# Patient Record
Sex: FEMALE | Race: WHITE | NOT HISPANIC OR LATINO | URBAN - METROPOLITAN AREA
[De-identification: names, ages, dates, MRNs, and addresses within clinical notes are randomized per-mention and may not be internally consistent; named-entity substitution may affect disease eponyms.]

---

## 2017-06-09 ENCOUNTER — OUTPATIENT (OUTPATIENT)
Dept: OUTPATIENT SERVICES | Facility: HOSPITAL | Age: 57
LOS: 1 days | End: 2017-06-09
Payer: COMMERCIAL

## 2017-06-09 VITALS
HEART RATE: 60 BPM | HEIGHT: 67 IN | WEIGHT: 246.92 LBS | RESPIRATION RATE: 16 BRPM | OXYGEN SATURATION: 99 % | TEMPERATURE: 98 F | SYSTOLIC BLOOD PRESSURE: 132 MMHG | DIASTOLIC BLOOD PRESSURE: 82 MMHG

## 2017-06-09 DIAGNOSIS — K80.30 CALCULUS OF BILE DUCT WITH CHOLANGITIS, UNSPECIFIED, WITHOUT OBSTRUCTION: ICD-10-CM

## 2017-06-09 DIAGNOSIS — K21.9 GASTRO-ESOPHAGEAL REFLUX DISEASE WITHOUT ESOPHAGITIS: ICD-10-CM

## 2017-06-09 DIAGNOSIS — G47.33 OBSTRUCTIVE SLEEP APNEA (ADULT) (PEDIATRIC): ICD-10-CM

## 2017-06-09 DIAGNOSIS — Z90.710 ACQUIRED ABSENCE OF BOTH CERVIX AND UTERUS: Chronic | ICD-10-CM

## 2017-06-09 DIAGNOSIS — I10 ESSENTIAL (PRIMARY) HYPERTENSION: ICD-10-CM

## 2017-06-09 DIAGNOSIS — Z98.890 OTHER SPECIFIED POSTPROCEDURAL STATES: Chronic | ICD-10-CM

## 2017-06-09 LAB
ALBUMIN SERPL ELPH-MCNC: 4.1 G/DL — SIGNIFICANT CHANGE UP (ref 3.3–5)
ALP SERPL-CCNC: 113 U/L — SIGNIFICANT CHANGE UP (ref 40–120)
ALT FLD-CCNC: 16 U/L — SIGNIFICANT CHANGE UP (ref 4–33)
AST SERPL-CCNC: 19 U/L — SIGNIFICANT CHANGE UP (ref 4–32)
BILIRUB SERPL-MCNC: 0.5 MG/DL — SIGNIFICANT CHANGE UP (ref 0.2–1.2)
BUN SERPL-MCNC: 9 MG/DL — SIGNIFICANT CHANGE UP (ref 7–23)
CALCIUM SERPL-MCNC: 9.4 MG/DL — SIGNIFICANT CHANGE UP (ref 8.4–10.5)
CHLORIDE SERPL-SCNC: 101 MMOL/L — SIGNIFICANT CHANGE UP (ref 98–107)
CO2 SERPL-SCNC: 28 MMOL/L — SIGNIFICANT CHANGE UP (ref 22–31)
CREAT SERPL-MCNC: 0.94 MG/DL — SIGNIFICANT CHANGE UP (ref 0.5–1.3)
GLUCOSE SERPL-MCNC: 87 MG/DL — SIGNIFICANT CHANGE UP (ref 70–99)
HCT VFR BLD CALC: 42.9 % — SIGNIFICANT CHANGE UP (ref 34.5–45)
HGB BLD-MCNC: 13.6 G/DL — SIGNIFICANT CHANGE UP (ref 11.5–15.5)
MCHC RBC-ENTMCNC: 29.2 PG — SIGNIFICANT CHANGE UP (ref 27–34)
MCHC RBC-ENTMCNC: 31.7 % — LOW (ref 32–36)
MCV RBC AUTO: 92.3 FL — SIGNIFICANT CHANGE UP (ref 80–100)
PLATELET # BLD AUTO: 187 K/UL — SIGNIFICANT CHANGE UP (ref 150–400)
PMV BLD: 10.2 FL — SIGNIFICANT CHANGE UP (ref 7–13)
POTASSIUM SERPL-MCNC: 4.2 MMOL/L — SIGNIFICANT CHANGE UP (ref 3.5–5.3)
POTASSIUM SERPL-SCNC: 4.2 MMOL/L — SIGNIFICANT CHANGE UP (ref 3.5–5.3)
PROT SERPL-MCNC: 7.9 G/DL — SIGNIFICANT CHANGE UP (ref 6–8.3)
RBC # BLD: 4.65 M/UL — SIGNIFICANT CHANGE UP (ref 3.8–5.2)
RBC # FLD: 14.1 % — SIGNIFICANT CHANGE UP (ref 10.3–14.5)
SODIUM SERPL-SCNC: 141 MMOL/L — SIGNIFICANT CHANGE UP (ref 135–145)
WBC # BLD: 6.79 K/UL — SIGNIFICANT CHANGE UP (ref 3.8–10.5)
WBC # FLD AUTO: 6.79 K/UL — SIGNIFICANT CHANGE UP (ref 3.8–10.5)

## 2017-06-09 PROCEDURE — 93010 ELECTROCARDIOGRAM REPORT: CPT

## 2017-06-09 RX ORDER — SODIUM CHLORIDE 9 MG/ML
1000 INJECTION, SOLUTION INTRAVENOUS
Qty: 0 | Refills: 0 | Status: DISCONTINUED | OUTPATIENT
Start: 2017-06-16 | End: 2017-07-01

## 2017-06-09 NOTE — H&P PST ADULT - MUSCULOSKELETAL
details… detailed exam no joint warmth/ROM intact/no joint erythema/no joint swelling/normal strength/no calf tenderness

## 2017-06-09 NOTE — H&P PST ADULT - RS GEN PE MLT RESP DETAILS PC
no rhonchi/no rales/respirations non-labored/good air movement/clear to auscultation bilaterally/breath sounds equal/no wheezes

## 2017-06-09 NOTE — H&P PST ADULT - HISTORY OF PRESENT ILLNESS
This is a 56 y.o. female who presented to [facility] complaining of abdominal pain . Pt had abdominal sono in past , abdominal ex-ray done. Pt has calculus of bile duct with cholangitis ,unspecified without obstruction . Pt now for surgery.

## 2017-06-09 NOTE — H&P PST ADULT - LYMPHATIC
anterior cervical R/supraclavicular L/posterior cervical L/supraclavicular R/anterior cervical L/posterior cervical R

## 2017-06-09 NOTE — H&P PST ADULT - PMH
Essential hypertension    Gastroesophageal reflux disease without esophagitis    Obesity  BMI 38.7  Vitamin D deficiency

## 2017-06-09 NOTE — H&P PST ADULT - NSANTHOSAYNRD_GEN_A_CORE
No. VIKRAM screening performed.  STOP BANG Legend: 0-2 = LOW Risk; 3-4 = INTERMEDIATE Risk; 5-8 = HIGH Risk

## 2017-06-16 ENCOUNTER — OUTPATIENT (OUTPATIENT)
Dept: OUTPATIENT SERVICES | Facility: HOSPITAL | Age: 57
LOS: 1 days | Discharge: HOME | End: 2017-06-16
Payer: COMMERCIAL

## 2017-06-16 VITALS
RESPIRATION RATE: 17 BRPM | OXYGEN SATURATION: 98 % | SYSTOLIC BLOOD PRESSURE: 131 MMHG | HEART RATE: 83 BPM | DIASTOLIC BLOOD PRESSURE: 64 MMHG

## 2017-06-16 VITALS
HEART RATE: 65 BPM | SYSTOLIC BLOOD PRESSURE: 176 MMHG | OXYGEN SATURATION: 100 % | RESPIRATION RATE: 16 BRPM | HEIGHT: 67 IN | WEIGHT: 246.92 LBS | DIASTOLIC BLOOD PRESSURE: 89 MMHG | TEMPERATURE: 98 F

## 2017-06-16 DIAGNOSIS — Z90.710 ACQUIRED ABSENCE OF BOTH CERVIX AND UTERUS: Chronic | ICD-10-CM

## 2017-06-16 DIAGNOSIS — Z98.890 OTHER SPECIFIED POSTPROCEDURAL STATES: Chronic | ICD-10-CM

## 2017-06-16 DIAGNOSIS — K80.30 CALCULUS OF BILE DUCT WITH CHOLANGITIS, UNSPECIFIED, WITHOUT OBSTRUCTION: ICD-10-CM

## 2017-06-16 LAB
BLD GP AB SCN SERPL QL: NEGATIVE — SIGNIFICANT CHANGE UP
RH IG SCN BLD-IMP: POSITIVE — SIGNIFICANT CHANGE UP

## 2017-06-16 PROCEDURE — 93010 ELECTROCARDIOGRAM REPORT: CPT

## 2017-06-16 PROCEDURE — 88304 TISSUE EXAM BY PATHOLOGIST: CPT | Mod: 26

## 2017-06-16 RX ORDER — ATENOLOL 25 MG/1
1 TABLET ORAL
Qty: 0 | Refills: 0 | COMMUNITY

## 2017-06-16 RX ORDER — FAMOTIDINE 10 MG/ML
1 INJECTION INTRAVENOUS
Qty: 0 | Refills: 0 | COMMUNITY

## 2017-06-16 RX ORDER — ONDANSETRON 8 MG/1
4 TABLET, FILM COATED ORAL ONCE
Qty: 0 | Refills: 0 | Status: DISCONTINUED | OUTPATIENT
Start: 2017-06-16 | End: 2017-07-01

## 2017-06-16 RX ORDER — OXYCODONE HYDROCHLORIDE 5 MG/1
10 TABLET ORAL ONCE
Qty: 0 | Refills: 0 | Status: DISCONTINUED | OUTPATIENT
Start: 2017-06-16 | End: 2017-07-01

## 2017-06-16 RX ORDER — KETOROLAC TROMETHAMINE 30 MG/ML
30 SYRINGE (ML) INJECTION ONCE
Qty: 0 | Refills: 0 | Status: DISCONTINUED | OUTPATIENT
Start: 2017-06-16 | End: 2017-06-16

## 2017-06-16 RX ORDER — HYDROMORPHONE HYDROCHLORIDE 2 MG/ML
0.5 INJECTION INTRAMUSCULAR; INTRAVENOUS; SUBCUTANEOUS
Qty: 0 | Refills: 0 | Status: DISCONTINUED | OUTPATIENT
Start: 2017-06-16 | End: 2017-06-16

## 2017-06-16 RX ADMIN — Medication 30 MILLIGRAM(S): at 19:59

## 2017-06-16 RX ADMIN — Medication 30 MILLIGRAM(S): at 19:48

## 2017-06-16 NOTE — ASU PREOP CHECKLIST - COMMENTS
Sip of water with famotidine andatenolol at 9:30 am Sip of water with famotidine and atenolol at 9:30 am

## 2017-06-16 NOTE — ASU DISCHARGE PLAN (ADULT/PEDIATRIC). - NOTIFY
Unable to Urinate/Fever greater than 101/Excessive Diarrhea/Bleeding that does not stop/Inability to Tolerate Liquids or Foods/Persistent Nausea and Vomiting/Pain not relieved by Medications

## 2017-06-16 NOTE — ASU DISCHARGE PLAN (ADULT/PEDIATRIC). - POST OP PHONE #
pt. granted permission to leave message /and or speak with whoever answers the phone. [phone] pt. granted permission to leave message /and or speak with whoever answers the phone.

## 2017-06-16 NOTE — ASU DISCHARGE PLAN (ADULT/PEDIATRIC). - NURSING INSTRUCTIONS
DO NOT take any Tylenol (Acetaminophen) or narcotics containing Tylenol until after  Midnight 6/17/17. You received Tylenol during your operation and it can cause damage to your liver if too much is taken within a 24 hour time period. DO NOT take any Motrin or Ibuprofen until after 0110 6/17/18  Apply ice for 20 minutes several times per day for the next 24-48 hours over your shirt as needed for comfort.

## 2017-06-16 NOTE — ASU DISCHARGE PLAN (ADULT/PEDIATRIC). - INSTRUCTIONS
low fat diet until follow up Please call upon discharge to schedule a follow up appointment in 10-14 days.

## 2017-06-16 NOTE — ASU DISCHARGE PLAN (ADULT/PEDIATRIC). - MEDICATION SUMMARY - MEDICATIONS TO TAKE
I will START or STAY ON the medications listed below when I get home from the hospital:    Vitamin D  -- 1 tab(s) by mouth once a day in am  -- Indication: For home medication    oxycodone-acetaminophen 5mg-325mg oral tablet  -- 1-2 tab(s) by mouth every 4-6 hours, As Needed -for severe pain MDD:8 tabs  -- Caution federal law prohibits the transfer of this drug to any person other  than the person for whom it was prescribed.  May cause drowsiness.  Alcohol may intensify this effect.  Use care when operating dangerous machinery.  This prescription cannot be refilled.  This product contains acetaminophen.  Do not use  with any other product containing acetaminophen to prevent possible liver damage.  Using more of this medication than prescribed may cause serious breathing problems.    -- Indication: For pain control - take as directed    atenolol 50 mg oral tablet  -- 1 tab(s) by mouth once a day in am  -- Indication: For home medication    hydroCHLOROthiazide 25 mg oral tablet  -- 1 tab(s) by mouth once a day in am  -- Indication: For home medication    famotidine 20 mg oral tablet  -- 1 tab(s) by mouth once a day in am  -- Indication: For home medication

## 2017-06-22 LAB — SURGICAL PATHOLOGY STUDY: SIGNIFICANT CHANGE UP

## 2021-06-12 NOTE — H&P PST ADULT - PHARYNX
Called pt to discuss and answered her questions.    Arti Brown RN, CBN  [facility]  P [phone]  F [phone]    
no redness

## 2023-02-21 NOTE — H&P PST ADULT - NS PRO REGISTERED ORGAN DONOR
No
Detail Level: Simple
Price (Do Not Change): 0.00
Instructions: This plan will send the code FBSD to the PM system.  DO NOT or CHANGE the price.